# Patient Record
Sex: MALE | Race: WHITE | NOT HISPANIC OR LATINO | Employment: FULL TIME | URBAN - NONMETROPOLITAN AREA
[De-identification: names, ages, dates, MRNs, and addresses within clinical notes are randomized per-mention and may not be internally consistent; named-entity substitution may affect disease eponyms.]

---

## 2024-04-26 ENCOUNTER — LAB (OUTPATIENT)
Dept: FAMILY MEDICINE CLINIC | Facility: CLINIC | Age: 44
End: 2024-04-26
Payer: COMMERCIAL

## 2024-04-26 ENCOUNTER — OFFICE VISIT (OUTPATIENT)
Dept: FAMILY MEDICINE CLINIC | Facility: CLINIC | Age: 44
End: 2024-04-26
Payer: COMMERCIAL

## 2024-04-26 VITALS
BODY MASS INDEX: 34.1 KG/M2 | WEIGHT: 225 LBS | DIASTOLIC BLOOD PRESSURE: 78 MMHG | HEART RATE: 83 BPM | TEMPERATURE: 98.7 F | HEIGHT: 68 IN | SYSTOLIC BLOOD PRESSURE: 136 MMHG | OXYGEN SATURATION: 97 %

## 2024-04-26 DIAGNOSIS — Z00.00 ENCOUNTER FOR MEDICAL EXAMINATION TO ESTABLISH CARE: Primary | ICD-10-CM

## 2024-04-26 DIAGNOSIS — Z00.00 ENCOUNTER FOR MEDICAL EXAMINATION TO ESTABLISH CARE: ICD-10-CM

## 2024-04-26 DIAGNOSIS — R06.83 SNORING: ICD-10-CM

## 2024-04-26 DIAGNOSIS — D22.9 ATYPICAL MOLE: ICD-10-CM

## 2024-04-26 DIAGNOSIS — R19.4 CHANGE IN BOWEL HABIT: ICD-10-CM

## 2024-04-26 DIAGNOSIS — M26.629 CHRONIC TMJ PAIN: ICD-10-CM

## 2024-04-26 DIAGNOSIS — F41.9 ANXIETY: ICD-10-CM

## 2024-04-26 DIAGNOSIS — G89.29 CHRONIC TMJ PAIN: ICD-10-CM

## 2024-04-26 PROCEDURE — 80061 LIPID PANEL: CPT

## 2024-04-26 PROCEDURE — 83036 HEMOGLOBIN GLYCOSYLATED A1C: CPT

## 2024-04-26 PROCEDURE — 84443 ASSAY THYROID STIM HORMONE: CPT

## 2024-04-26 PROCEDURE — 85027 COMPLETE CBC AUTOMATED: CPT

## 2024-04-26 PROCEDURE — 86803 HEPATITIS C AB TEST: CPT

## 2024-04-26 PROCEDURE — 80053 COMPREHEN METABOLIC PANEL: CPT

## 2024-04-26 PROCEDURE — 82607 VITAMIN B-12: CPT

## 2024-04-26 PROCEDURE — 81001 URINALYSIS AUTO W/SCOPE: CPT

## 2024-04-26 RX ORDER — ESCITALOPRAM OXALATE 20 MG/1
20 TABLET ORAL DAILY
Qty: 30 TABLET | Refills: 2 | Status: SHIPPED | OUTPATIENT
Start: 2024-04-26

## 2024-04-26 RX ORDER — CYCLOBENZAPRINE HCL 10 MG
10 TABLET ORAL DAILY PRN
Qty: 60 TABLET | Refills: 1 | Status: SHIPPED | OUTPATIENT
Start: 2024-04-26

## 2024-04-26 RX ORDER — ESCITALOPRAM OXALATE 20 MG/1
20 TABLET ORAL DAILY
COMMUNITY
End: 2024-04-26 | Stop reason: SDUPTHER

## 2024-04-26 NOTE — PROGRESS NOTES
Office Note     Name: Chance Lafleur    : 1980     MRN: [id number]     Chief Complaint  Establish care    History of Present Illness:  Chance Lafleur is a 43 y.o. male who presents today for establishment of care. His previous PCP was in [place].  He reports past medical history of anxiety and chronic TMJ pain.  His current daily medications include Lexapro.  He reports using cyclobenzaprine in the past intermittently for chronic TMJ issues.  He reports that he grinds his teeth at night.    He reports that he does not always take his Lexapro every day.  He reports that he is worried his Lexapro makes him fatigued.  He is not sure if his Lexapro is controlling his anxiety symptoms well at present time.  He denies significant depression symptoms.  Denies SI/HI.  He has not been on any other medications in the past.    He does report that he snores very loudly at night.  He does report that he has woken up gasping for air before.  His wife denies she has ever seen him stop breathing.    He also reports that for the past several months, his stool has been much runnier and watery than normal.  He does report that he noticed some blood on the toilet paper when he wiped several months ago.  He denies blood since then.  He denies known history of hemorrhoids.  He denies rectal pain.  He denies rectal itching.  He denies any abdominal pain or unexpected weight loss.  He denies any known family history of colon cancer.    He also reports that his chiropractor noticed a mole on his back that he would like to be seen by dermatology for.  He is unsure if there is been any change in the mole.      Subjective     Review of Systems:   Review of Systems   Constitutional:  Negative for chills, fatigue, fever and unexpected weight loss.   HENT:  Negative for trouble swallowing and voice change.    Eyes:  Negative for blurred vision and double vision.   Respiratory:  Negative for cough, shortness of breath and  wheezing.    Cardiovascular:  Negative for chest pain, palpitations and leg swelling.   Gastrointestinal:  Positive for blood in stool and diarrhea. Negative for abdominal pain, constipation, nausea and vomiting.   Genitourinary:  Negative for dysuria and hematuria.   Skin:  Negative for rash.   Neurological:  Negative for dizziness, syncope, light-headedness and headache.   Psychiatric/Behavioral:  Negative for suicidal ideas and depressed mood. The patient is nervous/anxious.        I have reviewed the patients family history, social history, past medical history, past surgical history and have updated it as appropriate.     Past Medical History:   Past Medical History:   Diagnosis Date    Anxiety     TMJ (temporomandibular joint syndrome)        Past Surgical History: History reviewed. No pertinent surgical history.    Family History:   Family History   Problem Relation Age of Onset    Diabetes Father     Heart attack Father     Heart attack Paternal Grandfather     Diabetes Paternal Grandmother        Social History:   Social History     Socioeconomic History    Marital status:    Tobacco Use    Smoking status: Former     Current packs/day: 1.00     Average packs/day: 1 pack/day for 26.3 years (26.3 ttl pk-yrs)     Types: Cigarettes     Start date: 1998     Passive exposure: Past    Smokeless tobacco: Never    Tobacco comments:     Use vape   Vaping Use    Vaping status: Every Day    Substances: Nicotine    Devices: Disposable   Substance and Sexual Activity    Alcohol use: Not Currently     Alcohol/week: 1.0 standard drink of alcohol     Comment: Special occasions    Drug use: Yes     Frequency: 3.0 times per week     Types: Marijuana    Sexual activity: Yes     Partners: Female       Immunizations:   Immunization History   Administered Date(s) Administered    Influenza Quad Vaccine (Inpatient) 10/28/2016    Tdap 06/08/2015        Medications:     Current Outpatient Medications:     escitalopram  "(LEXAPRO) 20 MG tablet, Take 1 tablet by mouth Daily., Disp: 30 tablet, Rfl: 2    cyclobenzaprine (FLEXERIL) 10 MG tablet, Take 1 tablet by mouth Daily As Needed for Muscle Spasms (TMJ pain)., Disp: 60 tablet, Rfl: 1    Allergies:   No Known Allergies    Objective     Vital Signs  Vitals:    04/26/24 1301   BP: 136/78   BP Location: Right arm   Patient Position: Sitting   Cuff Size: Adult   Pulse: 83   Temp: 98.7 °F (37.1 °C)   TempSrc: Temporal   SpO2: 97%   Weight: 102 kg (225 lb)   Height: 172.7 cm (68\")     Estimated body mass index is 34.21 kg/m² as calculated from the following:    Height as of this encounter: 172.7 cm (68\").    Weight as of this encounter: 102 kg (225 lb).          Physical Exam  Vitals and nursing note reviewed.   Constitutional:       General: He is not in acute distress.     Appearance: Normal appearance. He is not ill-appearing, toxic-appearing or diaphoretic.   HENT:      Head: Atraumatic.   Eyes:      Extraocular Movements: Extraocular movements intact.   Cardiovascular:      Rate and Rhythm: Normal rate and regular rhythm.      Heart sounds: No murmur heard.     No friction rub. No gallop.   Pulmonary:      Effort: Pulmonary effort is normal. No respiratory distress.      Breath sounds: No wheezing, rhonchi or rales.   Abdominal:      General: There is no distension.      Palpations: Abdomen is soft.      Tenderness: There is no abdominal tenderness. There is no right CVA tenderness, left CVA tenderness, guarding or rebound.   Musculoskeletal:      Cervical back: Normal range of motion.      Right lower leg: No edema.      Left lower leg: No edema.   Skin:     General: Skin is warm.          Neurological:      Mental Status: He is alert.      Gait: Gait normal.   Psychiatric:         Mood and Affect: Mood normal.         Thought Content: Thought content normal.          Assessment and Plan     1. Encounter for medical examination to establish care  -Wellness labs have been " ordered.  -Did advise tobacco cessation.  He is not agreeable at present time.  - CBC (No Diff); Future  - Comprehensive Metabolic Panel; Future  - Hemoglobin A1c; Future  - Hepatitis C Antibody; Future  - Lipid Panel; Future  - Vitamin B12; Future  - TSH Rfx On Abnormal To Free T4; Future  - Urinalysis With Culture If Indicated -; Future    2. Chronic TMJ pain  -Refill of his cyclobenzaprine has been sent to the pharmacy.  Did discuss possible side effects, including possible drowsiness.  Instructed not to take prior to driving or operating any machinery.  - cyclobenzaprine (FLEXERIL) 10 MG tablet; Take 1 tablet by mouth Daily As Needed for Muscle Spasms (TMJ pain).  Dispense: 60 tablet; Refill: 1    3. Anxiety  -PHQ-9 Total Score: 5  -ROSALINO 7 Total Score: 6  -He denies SI/HI.  He is unsure if Lexapro is controlling his symptoms well at present time.  However, because he has not been taking this medication faithfully, I have encouraged that he take medication faithfully and we will reassess in 1 month.  If his symptoms worsen prior to that time or he has significant side effects, he has been advised to return to care.  - escitalopram (LEXAPRO) 20 MG tablet; Take 1 tablet by mouth Daily.  Dispense: 30 tablet; Refill: 2    4. Change in bowel habit  -Did discuss that change in bowel habits can be concerning for colon cancer.  Have recommended referral to general surgery for colonoscopy for further evaluation.  -Did discuss use of daily fiber supplement to help bulk up stools.  - Ambulatory Referral to General Surgery    5. Atypical mole  -Referral has been placed for dermatology for further evaluation and possible biopsy.  Discussed ABCDEs of skin cancer and sunscreen for skin cancer prevention.  - Ambulatory Referral to Dermatology     6. Snoring  -Referral has been placed for further evaluation for possible sleep apnea.      Follow Up  Return in about 1 month (around 5/26/2024) for Recheck.    Codi Holden PA-C  [facility] [place]  [facility]

## 2024-04-27 LAB
ALBUMIN SERPL-MCNC: 5.1 G/DL (ref 3.5–5.2)
ALBUMIN/GLOB SERPL: 2 G/DL
ALP SERPL-CCNC: 76 U/L (ref 39–117)
ALT SERPL W P-5'-P-CCNC: 22 U/L (ref 1–41)
ANION GAP SERPL CALCULATED.3IONS-SCNC: 12 MMOL/L (ref 5–15)
AST SERPL-CCNC: 18 U/L (ref 1–40)
BACTERIA UR QL AUTO: ABNORMAL /HPF
BILIRUB SERPL-MCNC: 0.4 MG/DL (ref 0–1.2)
BILIRUB UR QL STRIP: NEGATIVE
BUN SERPL-MCNC: 7 MG/DL (ref 6–20)
BUN/CREAT SERPL: 8.3 (ref 7–25)
CALCIUM SPEC-SCNC: 9.7 MG/DL (ref 8.6–10.5)
CHLORIDE SERPL-SCNC: 106 MMOL/L (ref 98–107)
CHOLEST SERPL-MCNC: 236 MG/DL (ref 0–200)
CLARITY UR: CLEAR
CO2 SERPL-SCNC: 23 MMOL/L (ref 22–29)
COLOR UR: YELLOW
CREAT SERPL-MCNC: 0.84 MG/DL (ref 0.76–1.27)
DEPRECATED RDW RBC AUTO: 43.1 FL (ref 37–54)
EGFRCR SERPLBLD CKD-EPI 2021: 111.7 ML/MIN/1.73
ERYTHROCYTE [DISTWIDTH] IN BLOOD BY AUTOMATED COUNT: 13.7 % (ref 12.3–15.4)
GLOBULIN UR ELPH-MCNC: 2.5 GM/DL
GLUCOSE SERPL-MCNC: 82 MG/DL (ref 65–99)
GLUCOSE UR STRIP-MCNC: NEGATIVE MG/DL
HBA1C MFR BLD: 5.9 % (ref 4.8–5.6)
HCT VFR BLD AUTO: 47.3 % (ref 37.5–51)
HCV AB SER QL: NORMAL
HDLC SERPL-MCNC: 35 MG/DL (ref 40–60)
HGB BLD-MCNC: 16.2 G/DL (ref 13–17.7)
HGB UR QL STRIP.AUTO: NEGATIVE
HOLD SPECIMEN: NORMAL
HYALINE CASTS UR QL AUTO: ABNORMAL /LPF
KETONES UR QL STRIP: NEGATIVE
LDLC SERPL CALC-MCNC: 144 MG/DL (ref 0–100)
LDLC/HDLC SERPL: 3.97 {RATIO}
LEUKOCYTE ESTERASE UR QL STRIP.AUTO: NEGATIVE
MCH RBC QN AUTO: 29.4 PG (ref 26.6–33)
MCHC RBC AUTO-ENTMCNC: 34.2 G/DL (ref 31.5–35.7)
MCV RBC AUTO: 85.8 FL (ref 79–97)
NITRITE UR QL STRIP: NEGATIVE
PH UR STRIP.AUTO: 7 [PH] (ref 5–8)
PLATELET # BLD AUTO: 240 10*3/MM3 (ref 140–450)
PMV BLD AUTO: 12 FL (ref 6–12)
POTASSIUM SERPL-SCNC: 3.8 MMOL/L (ref 3.5–5.2)
PROT SERPL-MCNC: 7.6 G/DL (ref 6–8.5)
PROT UR QL STRIP: ABNORMAL
RBC # BLD AUTO: 5.51 10*6/MM3 (ref 4.14–5.8)
RBC # UR STRIP: ABNORMAL /HPF
REF LAB TEST METHOD: ABNORMAL
SODIUM SERPL-SCNC: 141 MMOL/L (ref 136–145)
SP GR UR STRIP: 1.01 (ref 1–1.03)
SQUAMOUS #/AREA URNS HPF: ABNORMAL /HPF
TRIGL SERPL-MCNC: 311 MG/DL (ref 0–150)
TSH SERPL DL<=0.05 MIU/L-ACNC: 1.01 UIU/ML (ref 0.27–4.2)
UROBILINOGEN UR QL STRIP: ABNORMAL
VIT B12 BLD-MCNC: 555 PG/ML (ref 211–946)
VLDLC SERPL-MCNC: 57 MG/DL (ref 5–40)
WBC # UR STRIP: ABNORMAL /HPF
WBC NRBC COR # BLD AUTO: 8.96 10*3/MM3 (ref 3.4–10.8)

## 2024-05-24 ENCOUNTER — OFFICE VISIT (OUTPATIENT)
Dept: FAMILY MEDICINE CLINIC | Facility: CLINIC | Age: 44
End: 2024-05-24
Payer: COMMERCIAL

## 2024-05-24 VITALS
WEIGHT: 223.2 LBS | TEMPERATURE: 99.3 F | BODY MASS INDEX: 33.83 KG/M2 | HEIGHT: 68 IN | SYSTOLIC BLOOD PRESSURE: 116 MMHG | HEART RATE: 87 BPM | OXYGEN SATURATION: 97 % | DIASTOLIC BLOOD PRESSURE: 78 MMHG | RESPIRATION RATE: 16 BRPM

## 2024-05-24 DIAGNOSIS — R73.03 PREDIABETES: ICD-10-CM

## 2024-05-24 DIAGNOSIS — F41.9 ANXIETY: Primary | ICD-10-CM

## 2024-05-24 DIAGNOSIS — R19.4 CHANGE IN BOWEL HABIT: ICD-10-CM

## 2024-05-24 PROCEDURE — 99213 OFFICE O/P EST LOW 20 MIN: CPT

## 2024-05-24 RX ORDER — ESCITALOPRAM OXALATE 20 MG/1
20 TABLET ORAL DAILY
Qty: 30 TABLET | Refills: 5 | Status: SHIPPED | OUTPATIENT
Start: 2024-05-24

## 2024-05-24 NOTE — PROGRESS NOTES
Office Note     Name: Chance Lafleur    : 1980     MRN: [id number]     Chief Complaint  Follow-up (1 month) and Anxiety    History of Present Illness:  Chance Lafleur is a 43 y.o. male who presents today for follow-up of anxiety.  He has been prescribed Lexapro 20 mg for some time but had not been taking it faithfully.  He reports that he is taking it every day.  He does report that he feels like his anxiety is a little better controlled.  He denies any SI or depression concerns.  He does report that sometimes he thinks he is fatigued from the Lexapro.    He does report that his bowels continue to remain soft.  He did buy fiber Gummies but has not been taking them.  He denies abdominal pain, nausea, or vomiting.  He does have upcoming colonoscopy scheduled .    Otherwise, patient is doing well and denies any acute health concerns.    Subjective     Review of Systems:   Review of Systems   Respiratory:  Negative for shortness of breath.    Cardiovascular:  Negative for chest pain and leg swelling.   Gastrointestinal:  Negative for abdominal pain, constipation, diarrhea, nausea and vomiting.   Neurological:  Negative for syncope.       I have reviewed the patients family history, social history, past medical history, past surgical history and have updated it as appropriate.     Past Medical History:   Past Medical History:   Diagnosis Date    Anxiety     TMJ (temporomandibular joint syndrome)        Past Surgical History: History reviewed. No pertinent surgical history.    Family History:   Family History   Problem Relation Age of Onset    Diabetes Father     Heart attack Father     Heart attack Paternal Grandfather     Diabetes Paternal Grandmother        Social History:   Social History     Socioeconomic History    Marital status:    Tobacco Use    Smoking status: Former     Current packs/day: 0.00     Average packs/day: 1 pack/day for 25.0 years (25.0 ttl pk-yrs)     Types:  "Cigarettes     Start date:      Quit date: 2022     Years since quittin.4     Passive exposure: Past    Smokeless tobacco: Never    Tobacco comments:     Use vape   Vaping Use    Vaping status: Every Day    Start date: 2022    Substances: Nicotine    Devices: Disposable    Passive vaping exposure: Yes   Substance and Sexual Activity    Alcohol use: Not Currently     Alcohol/week: 1.0 standard drink of alcohol     Comment: Special occasions    Drug use: Yes     Frequency: 3.0 times per week     Types: Marijuana    Sexual activity: Yes     Partners: Female       Immunizations:   Immunization History   Administered Date(s) Administered    Influenza Quad Vaccine (Inpatient) 10/28/2016    Tdap 2015        Medications:     Current Outpatient Medications:     cyclobenzaprine (FLEXERIL) 10 MG tablet, Take 1 tablet by mouth Daily As Needed for Muscle Spasms (TMJ pain)., Disp: 60 tablet, Rfl: 1    escitalopram (LEXAPRO) 20 MG tablet, Take 1 tablet by mouth Daily., Disp: 30 tablet, Rfl: 5    Allergies:   No Known Allergies    Objective     Vital Signs  Vitals:    24 1536   BP: 116/78   BP Location: Left arm   Patient Position: Sitting   Cuff Size: Adult   Pulse: 87   Resp: 16   Temp: 99.3 °F (37.4 °C)   TempSrc: Temporal   SpO2: 97%   Weight: 101 kg (223 lb 3.2 oz)   Height: 172.7 cm (68\")     Estimated body mass index is 33.94 kg/m² as calculated from the following:    Height as of this encounter: 172.7 cm (68\").    Weight as of this encounter: 101 kg (223 lb 3.2 oz).            Physical Exam  Vitals and nursing note reviewed.   Constitutional:       General: He is not in acute distress.     Appearance: Normal appearance. He is not ill-appearing, toxic-appearing or diaphoretic.   HENT:      Head: Normocephalic and atraumatic.   Eyes:      Extraocular Movements: Extraocular movements intact.   Cardiovascular:      Rate and Rhythm: Normal rate and regular rhythm.      Heart sounds: No murmur " heard.     No friction rub. No gallop.   Pulmonary:      Effort: Pulmonary effort is normal. No respiratory distress.      Breath sounds: No wheezing, rhonchi or rales.   Musculoskeletal:      Cervical back: Normal range of motion.      Right lower leg: No edema.      Left lower leg: No edema.   Skin:     Coloration: Skin is not pale.   Neurological:      Mental Status: He is alert and oriented to person, place, and time. Mental status is at baseline.   Psychiatric:         Mood and Affect: Mood normal.         Thought Content: Thought content normal.          Assessment and Plan     1. Anxiety  -ROSALINO 7 Total Score: 4  -ROSALINO-7 score has improved.  Patient does complain of some fatigue, he believes associated with Lexapro.  We did discuss possibly changing medications.  However, patient would like to stay on Lexapro and give it a little more time.  If he continues to remain fatigued on Lexapro and he would like to change medications, he has been advised to call or return to care sooner than his follow-up appointment.  - escitalopram (LEXAPRO) 20 MG tablet; Take 1 tablet by mouth Daily.  Dispense: 30 tablet; Refill: 5    2. Change in bowel habit  -He has been advised to take fiber supplementation faithfully to try to bulk up stool.  He does have pending colonoscopy.    3. Prediabetes  -Most recent blood work showed prediabetes.  We did discuss dietary modifications and increasing physical activity for better glycemic control.  He will be due to recheck A1c in 2 months.       Follow Up  Return in about 2 months (around 7/24/2024) for Recheck.    Codi Holden PA-C  [facility] [place] [facility]